# Patient Record
Sex: MALE | Race: WHITE
[De-identification: names, ages, dates, MRNs, and addresses within clinical notes are randomized per-mention and may not be internally consistent; named-entity substitution may affect disease eponyms.]

---

## 2017-01-12 ENCOUNTER — HOSPITAL ENCOUNTER (EMERGENCY)
Dept: HOSPITAL 45 - C.EDB | Age: 22
LOS: 1 days | Discharge: HOME | End: 2017-01-13
Payer: COMMERCIAL

## 2017-01-12 VITALS
WEIGHT: 197.53 LBS | BODY MASS INDEX: 28.28 KG/M2 | BODY MASS INDEX: 28.28 KG/M2 | HEIGHT: 70 IN | HEIGHT: 70 IN | WEIGHT: 197.53 LBS

## 2017-01-12 VITALS — TEMPERATURE: 98.42 F

## 2017-01-12 DIAGNOSIS — Z98.890: ICD-10-CM

## 2017-01-12 DIAGNOSIS — H57.9: ICD-10-CM

## 2017-01-12 DIAGNOSIS — Z83.511: ICD-10-CM

## 2017-01-12 DIAGNOSIS — Z87.891: ICD-10-CM

## 2017-01-12 DIAGNOSIS — H40.89: Primary | ICD-10-CM

## 2017-01-12 DIAGNOSIS — Z98.818: ICD-10-CM

## 2017-01-12 NOTE — EMERGENCY ROOM VISIT NOTE
History


First contact with patient:  21:06


Chief Complaint:  EYE ASSESSMENT


Stated Complaint:  FOREIGN OBJECT IN EYE





History of Present Illness


The patient is a 21 year old male who presents to the Emergency Room via 

private vehicle with complaints of "foreign object in eye".  The patient states 

that he had PRK surgery that was performed by Chandrika Aparicio,.  The patient states 

that shortly prior to arrival here today he was at home and felt like something 

was in his left lower eye like an eyelash.  He denies any trauma or pain to the 

area he just notes discomfort in this region.  He denies any vision changes or 

allergies.  Patient's tetanus is up-to-date.





Review of Systems


A complete 6-point Review of Systems was discussed with the patient, with 

pertinent positives and negatives listed in the History of Present Illness. All 

remaining Review of Systems questions can be considered negative unless 

otherwise specified.





Past Medical/Surgical History


PRK eye surgery on December 15 2016, wisdom teeth extraction 2012.





Family History


High blood pressure





Social History


Smoking Status:  Former Smoker


Social History:


Patient lives in a studio apartment alone.  He does not use tobacco products 

but does admit to a little bit of alcohol use.





Current/Historical Medications


Scheduled


Lisdexamfetamine Dimesylate (Vyvanse), 50 MG PO 5XWK





Allergies


Coded Allergies:  


     No Known Allergies (Unverified , 1/12/11)





Physical Exam


Vital Signs











  Date Time  Temp Pulse Resp B/P Pulse Ox O2 Delivery O2 Flow Rate FiO2


 


1/13/17 00:18  69 18 154/97 100   


 


1/12/17 22:48  83 18 154/82 99 Room Air  


 


1/12/17 21:02 36.9 98 20 153/91 95 Room Air  








Right Eye Acuity:  20/40


Left Eye Acuity:  20/40





Physical Exam


VITAL SIGNS - Vital signs and nursing notes were reviewed.  Patient is afebrile

, he is hypertensive at 153/91, he is non-tachycardic and is saturating well on 

room air 95%.


GENERAL -21-year-old male appearing his stated age. Communicates well with 

provider and answers questions appropriately.


HEAD - Normocephalic, Atraumatic. No Lawler's Sign or Raccoon's Eyes. 


EYES - PERRL with EOMI bilaterally. Sclera  without noticeable foreign body or 

excoriations.  No injection noted in the left eye.  Bilaterally Without 

subconjunctival hemorrhage. Palpebral conjunctiva pink and moist with no 

injection or discharge noted. Brief fundoscopic exam demonstrates no AV-nicking

, cotton wool spots, or flame hemorrhages. Slit lamp examination performed as 

further described.


EARS - No deformities of external structures noted on gross examination 

bilaterally. 


NOSE - Midline and without cyanosis. Without discharge.  


MOUTH/OROPHARYNX - Without perioral cyanosis. 





Slit Lamp Examination was performed of the left eye(s). Alcaine drops were 

applied to the affected eye(s) for proper anesthetization. The affected eye(s) 

were stained with Fluorescein stain to precipitate adequate visualization of 

any conjunctival/scleral excoriations or ulcers. The patient's face was 

comfortably rested on the chin guard of the slit lamp apparatus. The lights 

were dimmed and the affected eye(s) were thoroughly examined under microscopy 

using the blue light.  No uptake was present within the left eye. Additionally, 

the eye(s) were examined under microscopy using the regular light. Close 

examination revealed an unremarkable eye. Patient tolerated the procedure well 

and no complications were met.





10:14 PM: An Automated Tonometer was utilized to obtain bilateral orbital 

pressures.  The pressures in the LEFT eye were found to be 48, 42,43 and 43 

with an average of 44. The pressures in the RIGHT eye were found to be 42, 39,

40 and 42 with an average of 40.8. Patient tolerated the procedure well and no 

complications were met.





11:51 PM An Automated Tonometer was utilized to obtain bilateral orbital 

pressures.  The pressures in the LEFT eye were found to be 53, 45,43 and 46 

with an average of 46.8. The pressures in the RIGHT eye were found to be 49, 43,

49 and 42 with an average of 45.8. Patient tolerated the procedure well and no 

complications were met.





Medical Decision & Procedures


Medications Administered











 Medications


  (Trade)  Dose


 Ordered  Sig/Juan


 Route  Start Time


 Stop Time Status Last Admin


Dose Admin


 


 Proparacaine HCl


  (Alcaine 0.5%


 Oph Soln)  2 drops  NOW  STAT


 OP  1/12/17 21:14


 1/12/17 21:15 DC 1/12/17 21:25


2 DROPS


 


 Timolol Maleate


  (Timoptic 0.25%


 Oph Soln)  1 drops  NOW  STAT


 OP  1/12/17 23:24


 1/12/17 23:26 DC 1/12/17 23:36


75 DROPS


 


 Travoprost


  (Travatan Z)  1 drops  NOW


 OP  1/12/17 23:30


 1/13/17 01:05 DC 1/12/17 23:48


1 DROPS











Medical Decision


Patient was seen and evaluated as above.  After obtaining a thorough history 

and physical examination a detailed slit-lamp exam and pressure readings of the 

eyes were obtained.  The father was present for this examination.  I tested the 

pressures first and was concerned due to the elevation.  Please refer to 

physical exam for specific pressures.  Due to this finding there was also noted 

family history of glaucoma in father and mother of the patient.  I did stain 

the eye and did not visualize any foreign body, abrasion or ulceration.  My 

concern after this was due to the patient's pressure readings without a 

diagnosis of glaucoma.  These appear to be new findings.  I was also concerned 

because he had surgery performed about one month ago and was PRK.  I then spoke 

with the on-call doctor for the eye group who performed the surgery.  I spoke 

with Dr. Draper.  This culture place at approximately 10:50 PM.  He recommended 

that I lower the patient's eye pressures into the mid to upper 20s for this 

evening by using Iopidine and Combigan.  These were to be one drop in each eye 

than wait 20 minutes and repeat pressure checks.  He was then to be sent home 

on Combigan.  He was recommended the patient call the office first thing in the 

morning for reevaluation.  I spoke with the patient and the patient's father 

about this and they were both concerned and requested that I speak with the on-

call ophthalmologist for second opinion which I felt was reasonable given the 

situation.  I then spoke with Dr. Swanson at approximately 11:15 PM and I 

discussed the case.  I then discussed with the pharmacy the different eyedrops 

we had and then called him back so that we could make a decision based upon 

what eyedrops we had here.  It was decided to provide the patient with Timolol 

eyedrops 1 in each eye and then used Travatan 1 drop in each eye 5 minutes 

later.  These were then placed in the patient's eyes as per recommendation and 

I repeated the pressures 10:15 minutes later.  The concern was that upon 

pressure recheck there was an elevation.  I then called Dr. Swanson back at 12:

11 AM.  He respectfully took call and indicated that he would be happy to see 

the patient at his office immediately for further evaluation and management.  I 

felt this was reasonable and instructed the patient and father upon this.  They 

seemed happy with this plan and then were to drive immediately there.  I 

believe this is appropriate and will better serve the patient.  Please refer to 

documentation done by this physician regarding patient management.  He was 

instructed upon management, was educated upon worrisome symptoms in which to 

return and then the patient was discharged from our facility.





In the evaluation and treatment of this patient, the following differential 

diagnoses were considered: Corneal Abrasion, glaucoma, Conjunctivitis, Eye 

Contusion, Globe Injury, Orbital Floor Injury (Blowout Fracture), Corneal Ulcer

, Keratitis, Herpes Zoster Opthalmic, Blepharitis, Orbital Cellulitis, Iritis, 

Scleritis/Episcleritis, Uveitis, Temporal Arteritis, Subconjunctival Hemorrhage.





Impression





 Primary Impression:  


 Glaucoma (increased eye pressure)


 Additional Impression:  


 Irritation of left eye





Departure Information


Dispostion


Home / Self-Care





Condition


GOOD





Referrals


Brennon Joe M.D. (PCP)








Franco Swanson M.D. Turco, Domenic A., D.O.





Patient Instructions


My [facility]





Additional Instructions





You have been treated in the Emergency Department today for your left eye 

irritation and were found to have elevated eye pressures in both eyes.  





Your right eye pressure average was 40.8.  Your left eye pressure average was 

44.0.  These were average based upon four measurements taken from each eye.





The second reading after the medication revealed a right eye pressure of 45.8 

and left eye of 46.8.





Please go directly to Dr. Swanson's office at [facility].  He will see 

you right now.





For pain control, you can use the following over-the-counter medicines (if >13 yo):





- Regular strength (325mg/tab) Tylenol (acetaminophen) 2 tabs every 4-6 hours 

as needed. Do not exceed 12 tablets in a 24 hour period. Avoid taking more than 

4 grams (4000 mg) of Tylenol per day. This includes any other sources of 

acetaminophen you may take on a regular basis.





- Regular strength (200 mg/tab) Advil (ibuprofen) 1-2 tabs every 4-6 hours as 

needed. Do not exceed a dose of 3200 mg per day.





You should relax in a quiet, dark place for the rest of the day. You should 

wear sunglasses while outside for the next few days until your eyes are not as 

sensitive to the light.





Please call your regular optometrist to schedule follow-up as soon as possible.

  It is recommended that he call their office first thing tomorrow morning.





Please call the ophthalmologist office listed to schedule follow-up first thing 

tomorrow morning.  Dr. Swanson is to call  tomorrow morning at 8 AM to 

alert them that you may be calling.








If he would experience any vision changes or worsening eye pain it is 

recommended you return immediately.





Return to the Emergency Department if your current symptoms worsen despite 

treatment course outlined above, or if you develop any of the following symptoms

: intractable pain, visual disturbances, loss of vision, increased redness, 

swelling, drainage, or if you develop a fever.





Please return to the emergency department with any new/concerning symptoms.





Problem Qualifiers








 Primary Impression:  


 Glaucoma (increased eye pressure)


 Glaucoma type:  other  Laterality:  bilateral  Qualified Codes:  H40.89 - 

Other specified glaucoma

## 2017-01-13 VITALS — OXYGEN SATURATION: 100 % | DIASTOLIC BLOOD PRESSURE: 97 MMHG | HEART RATE: 69 BPM | SYSTOLIC BLOOD PRESSURE: 154 MMHG

## 2017-07-14 ENCOUNTER — HOSPITAL ENCOUNTER (OUTPATIENT)
Dept: HOSPITAL 45 - C.CTS | Age: 22
Discharge: HOME | End: 2017-07-14
Attending: ORTHOPAEDIC SURGERY
Payer: COMMERCIAL
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
## 2017-07-14 DIAGNOSIS — S52.592A: Primary | ICD-10-CM

## 2017-07-14 NOTE — DIAGNOSTIC IMAGING REPORT
******** ADDENDUM ********







1. Findings should state potential incomplete nondisplaced cortical fracture

distal navicular

2. IMPRESSION:

1. Nondisplaced cortical fracture lateral triquetral.

2. Incomplete nondisplaced cortical fracture distal navicular.

3. Anatomic alignment







Electronically signed by:  Juancho Luque M.D.

7/14/2017 7:11 PM



Dictated Date/Time:  7/14/2017 7:08 PM



******** ORIGINAL REPORT ********





LEFT UPPER EXTREMITY WITHOUT



CT DOSE: 200.92 mGy.cm



HISTORY: Fracture  CLOSED FRACTURE, L WRIST



TECHNIQUE: Multiaxial CT images of the left wrist were performed and reformatted

in the sagittal and coronal plane without the use of contrast.



COMPARISON:  None.



FINDINGS: Nondisplaced cortical fracture lateral margin of the triquetral bone

no evidence for displacement. All remaining osseous structures are unremarkable.

Potential incomplete nondisplaced cortical fracture distal ventricular. Moderate

soft tissue edema.



IMPRESSION:  



1. Nondisplaced cortical fracture lateral right lateral bone.





2. Incomplete nondisplaced cortical fracture distal navicular

3. Alignment is anatomic throughout











The above report was generated using voice recognition software.  It may contain

grammatical, syntax or spelling errors.







Electronically signed by:  Juancho Luque M.D.

7/14/2017 6:49 PM



Dictated Date/Time:  7/14/2017 6:44 PM

## 2018-01-19 ENCOUNTER — HOSPITAL ENCOUNTER (OUTPATIENT)
Dept: HOSPITAL 45 - C.LAB | Age: 23
Discharge: HOME | End: 2018-01-19
Attending: UROLOGY
Payer: COMMERCIAL

## 2018-01-19 DIAGNOSIS — Z00.00: Primary | ICD-10-CM

## 2018-01-19 LAB
KETONES UR QL STRIP: 72 MG/DL
PH UR: 151 MG/DL (ref 0–200)